# Patient Record
Sex: MALE | Race: WHITE | HISPANIC OR LATINO
[De-identification: names, ages, dates, MRNs, and addresses within clinical notes are randomized per-mention and may not be internally consistent; named-entity substitution may affect disease eponyms.]

---

## 2018-08-07 PROBLEM — Z00.00 ENCOUNTER FOR PREVENTIVE HEALTH EXAMINATION: Status: ACTIVE | Noted: 2018-08-07

## 2018-10-16 ENCOUNTER — RECORD ABSTRACTING (OUTPATIENT)
Age: 53
End: 2018-10-16

## 2018-10-16 DIAGNOSIS — E87.1 HYPO-OSMOLALITY AND HYPONATREMIA: ICD-10-CM

## 2018-10-17 ENCOUNTER — APPOINTMENT (OUTPATIENT)
Dept: ENDOCRINOLOGY | Facility: CLINIC | Age: 53
End: 2018-10-17
Payer: MEDICARE

## 2018-10-17 VITALS
HEIGHT: 67.5 IN | WEIGHT: 215 LBS | SYSTOLIC BLOOD PRESSURE: 130 MMHG | HEART RATE: 65 BPM | BODY MASS INDEX: 33.35 KG/M2 | DIASTOLIC BLOOD PRESSURE: 76 MMHG

## 2018-10-17 DIAGNOSIS — K76.0 FATTY (CHANGE OF) LIVER, NOT ELSEWHERE CLASSIFIED: ICD-10-CM

## 2018-10-17 DIAGNOSIS — Z78.9 OTHER SPECIFIED HEALTH STATUS: ICD-10-CM

## 2018-10-17 DIAGNOSIS — G47.33 OBSTRUCTIVE SLEEP APNEA (ADULT) (PEDIATRIC): ICD-10-CM

## 2018-10-17 DIAGNOSIS — Z83.3 FAMILY HISTORY OF DIABETES MELLITUS: ICD-10-CM

## 2018-10-17 DIAGNOSIS — F32.9 MAJOR DEPRESSIVE DISORDER, SINGLE EPISODE, UNSPECIFIED: ICD-10-CM

## 2018-10-17 PROCEDURE — 95251 CONT GLUC MNTR ANALYSIS I&R: CPT

## 2018-10-17 PROCEDURE — 99214 OFFICE O/P EST MOD 30 MIN: CPT | Mod: 25

## 2018-10-17 RX ORDER — MULTIVIT-MIN/IRON/FOLIC ACID/K 18-600-40
CAPSULE ORAL
Refills: 0 | Status: ACTIVE | COMMUNITY

## 2018-10-17 RX ORDER — CHOLECALCIFEROL (VITAMIN D3) 50 MCG
50 MCG TABLET ORAL
Qty: 90 | Refills: 0 | Status: ACTIVE | COMMUNITY

## 2018-10-17 RX ORDER — LOSARTAN POTASSIUM 100 MG/1
100 TABLET, FILM COATED ORAL DAILY
Qty: 1 | Refills: 0 | Status: ACTIVE | COMMUNITY

## 2018-10-17 RX ORDER — ATORVASTATIN CALCIUM 40 MG/1
40 TABLET, FILM COATED ORAL
Qty: 90 | Refills: 0 | Status: ACTIVE | COMMUNITY

## 2019-01-04 ENCOUNTER — MEDICATION RENEWAL (OUTPATIENT)
Age: 54
End: 2019-01-04

## 2019-01-07 ENCOUNTER — MEDICATION RENEWAL (OUTPATIENT)
Age: 54
End: 2019-01-07

## 2019-01-08 ENCOUNTER — RECORD ABSTRACTING (OUTPATIENT)
Age: 54
End: 2019-01-08

## 2019-01-08 DIAGNOSIS — Z86.69 PERSONAL HISTORY OF OTHER DISEASES OF THE NERVOUS SYSTEM AND SENSE ORGANS: ICD-10-CM

## 2019-01-08 DIAGNOSIS — Z80.9 FAMILY HISTORY OF MALIGNANT NEOPLASM, UNSPECIFIED: ICD-10-CM

## 2019-01-08 DIAGNOSIS — Z86.59 PERSONAL HISTORY OF OTHER MENTAL AND BEHAVIORAL DISORDERS: ICD-10-CM

## 2019-01-08 DIAGNOSIS — Z78.9 OTHER SPECIFIED HEALTH STATUS: ICD-10-CM

## 2019-01-08 DIAGNOSIS — Z86.39 PERSONAL HISTORY OF OTHER ENDOCRINE, NUTRITIONAL AND METABOLIC DISEASE: ICD-10-CM

## 2019-01-11 ENCOUNTER — CHART COPY (OUTPATIENT)
Age: 54
End: 2019-01-11

## 2019-01-16 ENCOUNTER — APPOINTMENT (OUTPATIENT)
Dept: ENDOCRINOLOGY | Facility: CLINIC | Age: 54
End: 2019-01-16
Payer: MEDICARE

## 2019-01-16 VITALS
OXYGEN SATURATION: 98 % | HEIGHT: 68 IN | BODY MASS INDEX: 31.07 KG/M2 | DIASTOLIC BLOOD PRESSURE: 70 MMHG | SYSTOLIC BLOOD PRESSURE: 112 MMHG | HEART RATE: 73 BPM | WEIGHT: 205 LBS

## 2019-01-16 LAB — GLUCOSE BLDC GLUCOMTR-MCNC: 158

## 2019-01-16 PROCEDURE — 82962 GLUCOSE BLOOD TEST: CPT

## 2019-01-16 PROCEDURE — 99214 OFFICE O/P EST MOD 30 MIN: CPT | Mod: 25

## 2019-01-16 NOTE — HISTORY OF PRESENT ILLNESS
[FreeTextEntry1] : Type: 1\par Severity: moderate\par Duration: 50 years (diagnosed at 18 months old)\par Associated symptoms: nephropathy, retinopathy, hypoglycemia\par Modifying factors: worse due to steroids for MS\par Gets MS treatments q 6 months and receives IV steroids at that time. Also gets PO steroids during periods of exacerbations. Last received PO steroids about 3 weeks ago.\par \par Current meds for glycemic control:\par [place] with Apidra\par SMBG with Dexcom. Calibrates every 12 hours\par Difficulty changing infusion sites and sensor sites on time due to supply issues.\par \par Last eye exam: cataract surgery July and August 2018. Retinopathy is stable since 2008\par Last foot exam: September 2018 (Dr Oscar Vidal)\par Diet: carb counting\par Weight: gain\par Exercise: walks the dog

## 2019-01-16 NOTE — ASSESSMENT
[FreeTextEntry1] : DM type 1, improving control\par Hypertension stable\par hyperlipidemia controlled

## 2019-02-07 ENCOUNTER — RX RENEWAL (OUTPATIENT)
Age: 54
End: 2019-02-07

## 2019-04-12 ENCOUNTER — APPOINTMENT (OUTPATIENT)
Dept: ENDOCRINOLOGY | Facility: CLINIC | Age: 54
End: 2019-04-12
Payer: COMMERCIAL

## 2019-04-12 PROCEDURE — P0015: CPT

## 2019-05-15 ENCOUNTER — APPOINTMENT (OUTPATIENT)
Dept: ENDOCRINOLOGY | Facility: CLINIC | Age: 54
End: 2019-05-15
Payer: MEDICARE

## 2019-05-15 VITALS
HEIGHT: 68 IN | WEIGHT: 210 LBS | BODY MASS INDEX: 31.83 KG/M2 | DIASTOLIC BLOOD PRESSURE: 64 MMHG | HEART RATE: 80 BPM | SYSTOLIC BLOOD PRESSURE: 122 MMHG

## 2019-05-15 LAB — GLUCOSE BLDC GLUCOMTR-MCNC: 103

## 2019-05-15 PROCEDURE — 82962 GLUCOSE BLOOD TEST: CPT

## 2019-05-15 PROCEDURE — 99214 OFFICE O/P EST MOD 30 MIN: CPT | Mod: 25

## 2019-05-15 RX ORDER — BUPROPION HYDROCHLORIDE 75 MG/1
TABLET, FILM COATED ORAL
Refills: 0 | Status: DISCONTINUED | COMMUNITY
End: 2019-05-15

## 2019-05-15 RX ORDER — TAMSULOSIN HYDROCHLORIDE 0.4 MG/1
0.4 CAPSULE ORAL
Refills: 0 | Status: DISCONTINUED | COMMUNITY
End: 2019-05-15

## 2019-05-15 NOTE — HISTORY OF PRESENT ILLNESS
[FreeTextEntry1] : Type: 1\par Severity: moderate\par Duration: 50 years (diagnosed at 18 months old)\par Associated symptoms: nephropathy, retinopathy, hypoglycemia\par Modifying factors: worse due to steroids for MS\par Gets MS treatments q 6 months and receives IV steroids at that time. Also gets PO steroids during periods of exacerbations. Last received PO steroids about 3 weeks ago.\par \par Current meds for glycemic control:\par Medtronic 670g with Apidra\par SMBG with Dexcom. Calibrates every 12 hours\par Difficulty changing infusion sites and sensor sites on time due to supply issues.\par \par Last eye exam: cataract surgery July and August 2018. Retinopathy is stable since 2008\par Last foot exam: September 2018 (Dr Oscar Vidal)\par Diet: carb counting\par Weight: gain\par Exercise: walks the dog\par \par self cath

## 2019-06-05 ENCOUNTER — OTHER (OUTPATIENT)
Age: 54
End: 2019-06-05

## 2019-08-01 ENCOUNTER — MEDICATION RENEWAL (OUTPATIENT)
Age: 54
End: 2019-08-01

## 2019-09-26 LAB
HBA1C MFR BLD HPLC: 7.2
LDLC SERPL DIRECT ASSAY-MCNC: 72

## 2019-09-27 ENCOUNTER — APPOINTMENT (OUTPATIENT)
Dept: ENDOCRINOLOGY | Facility: CLINIC | Age: 54
End: 2019-09-27
Payer: MEDICARE

## 2019-09-27 VITALS
DIASTOLIC BLOOD PRESSURE: 70 MMHG | OXYGEN SATURATION: 97 % | WEIGHT: 210 LBS | SYSTOLIC BLOOD PRESSURE: 132 MMHG | HEIGHT: 68 IN | HEART RATE: 75 BPM | BODY MASS INDEX: 31.83 KG/M2

## 2019-09-27 LAB — GLUCOSE BLDC GLUCOMTR-MCNC: 128

## 2019-09-27 PROCEDURE — 99214 OFFICE O/P EST MOD 30 MIN: CPT

## 2019-09-27 RX ORDER — SYRING-NEEDL,DISP,INSUL,0.3 ML 31GX15/64"
31G X 15/64" SYRINGE, EMPTY DISPOSABLE MISCELLANEOUS
Refills: 0 | Status: DISCONTINUED | COMMUNITY
End: 2019-09-27

## 2019-09-27 NOTE — PHYSICAL EXAM
[Clear to Auscultation] : lungs were clear to auscultation bilaterally [Regular Rhythm] : with a regular rhythm

## 2020-02-21 LAB
HBA1C MFR BLD HPLC: 7.3
LDLC SERPL DIRECT ASSAY-MCNC: 81

## 2020-02-24 ENCOUNTER — APPOINTMENT (OUTPATIENT)
Dept: ENDOCRINOLOGY | Facility: CLINIC | Age: 55
End: 2020-02-24
Payer: MEDICARE

## 2020-02-24 VITALS
DIASTOLIC BLOOD PRESSURE: 70 MMHG | SYSTOLIC BLOOD PRESSURE: 134 MMHG | HEART RATE: 89 BPM | HEIGHT: 68 IN | BODY MASS INDEX: 31.67 KG/M2 | WEIGHT: 209 LBS

## 2020-02-24 LAB — GLUCOSE BLDC GLUCOMTR-MCNC: 233

## 2020-02-24 PROCEDURE — 99215 OFFICE O/P EST HI 40 MIN: CPT | Mod: 25

## 2020-02-24 PROCEDURE — 82962 GLUCOSE BLOOD TEST: CPT

## 2020-02-24 RX ORDER — RANITIDINE 150 MG/1
150 TABLET ORAL
Refills: 0 | Status: ACTIVE | COMMUNITY

## 2020-02-24 RX ORDER — TAMSULOSIN HYDROCHLORIDE 0.4 MG/1
0.4 CAPSULE ORAL
Refills: 0 | Status: ACTIVE | COMMUNITY

## 2020-02-24 RX ORDER — AMLODIPINE BESYLATE 10 MG/1
10 TABLET ORAL DAILY
Qty: 90 | Refills: 0 | Status: ACTIVE | COMMUNITY

## 2020-02-24 RX ORDER — LATANOPROST/PF 0.005 %
0.01 DROPS OPHTHALMIC (EYE)
Refills: 0 | Status: ACTIVE | COMMUNITY

## 2020-02-24 RX ORDER — BUPROPION HYDROCHLORIDE 300 MG/1
300 TABLET, EXTENDED RELEASE ORAL DAILY
Qty: 30 | Refills: 0 | Status: ACTIVE | COMMUNITY

## 2020-02-24 RX ORDER — LABETALOL HYDROCHLORIDE 100 MG/1
100 TABLET, FILM COATED ORAL
Refills: 0 | Status: ACTIVE | COMMUNITY

## 2020-02-24 RX ORDER — PANTOPRAZOLE 20 MG/1
20 TABLET, DELAYED RELEASE ORAL
Refills: 0 | Status: ACTIVE | COMMUNITY

## 2020-02-24 RX ORDER — SERTRALINE HYDROCHLORIDE 100 MG/1
100 TABLET, FILM COATED ORAL
Refills: 0 | Status: ACTIVE | COMMUNITY

## 2020-02-24 RX ORDER — OCRELIZUMAB 300 MG/10ML
300 INJECTION INTRAVENOUS
Refills: 0 | Status: ACTIVE | COMMUNITY

## 2020-02-24 RX ORDER — GABAPENTIN 100 MG/1
100 CAPSULE ORAL
Refills: 0 | Status: ACTIVE | COMMUNITY

## 2020-02-24 RX ORDER — FINASTERIDE 5 MG/1
5 TABLET, FILM COATED ORAL
Refills: 0 | Status: ACTIVE | COMMUNITY

## 2020-02-24 RX ORDER — MODAFINIL 100 MG/1
100 TABLET ORAL DAILY
Qty: 90 | Refills: 0 | Status: ACTIVE | COMMUNITY

## 2020-02-24 NOTE — ASSESSMENT
[FreeTextEntry1] : DM type 1, overall good but suboptimal control. Review of dexcom reveals hypoglycemic trend overnight related to HS snack bolus; as a result changed ICR and CF at that time.\par Discussed upgrade to tandem if out of warranty\par discussed upgrade to G6 dexcom\par provided sample of humalog vial, and pt. will check with his insurance plan about alternate rapid acting insulins\par given pt. assistance paperwork\par Hypertension stable\par hyperlipidemia controlled negative...

## 2020-02-24 NOTE — HISTORY OF PRESENT ILLNESS
[FreeTextEntry1] : Type: 1\par Severity: moderate\par Duration: >50 years (diagnosed at 18 months old)\par Associated symptoms: nephropathy, retinopathy, hypoglycemia\par Modifying factors: worse due to steroids for MS\par Gets MS treatments q 6 months and receives IV steroids at that time. Also gets PO steroids during periods of exacerbations.\par \par Current meds for glycemic control:\par Medtronic 670g with Apidra. Having difficulty affording apidra\par SMBG with Dexcom. Calibrates every 12 hours\par Difficulty changing infusion sites and sensor sites on time due to supply issues.\par \par Last eye exam: cataract surgery July and August 2018. Retinopathy is stable since 2008\par Last foot exam: September 2018 (Dr Oscar Vidal)\par Diet: carb counting\par Weight: gain\par Exercise: walks the dog\par \par self cath

## 2020-04-24 ENCOUNTER — APPOINTMENT (OUTPATIENT)
Dept: ENDOCRINOLOGY | Facility: CLINIC | Age: 55
End: 2020-04-24

## 2020-04-24 ENCOUNTER — APPOINTMENT (OUTPATIENT)
Dept: ENDOCRINOLOGY | Facility: CLINIC | Age: 55
End: 2020-04-24
Payer: MEDICARE

## 2020-04-24 PROCEDURE — G0108 DIAB MANAGE TRN  PER INDIV: CPT | Mod: CR

## 2020-04-24 PROCEDURE — 98968 PH1 ASSMT&MGMT NQHP 21-30: CPT | Mod: CR

## 2020-06-18 ENCOUNTER — APPOINTMENT (OUTPATIENT)
Dept: ENDOCRINOLOGY | Facility: CLINIC | Age: 55
End: 2020-06-18
Payer: MEDICARE

## 2020-06-18 PROCEDURE — G0108 DIAB MANAGE TRN  PER INDIV: CPT

## 2020-07-16 ENCOUNTER — APPOINTMENT (OUTPATIENT)
Dept: ENDOCRINOLOGY | Facility: CLINIC | Age: 55
End: 2020-07-16
Payer: MEDICARE

## 2020-07-16 PROCEDURE — ZZZZZ: CPT

## 2020-07-16 PROCEDURE — 95249 CONT GLUC MNTR PT PROV EQP: CPT

## 2020-07-30 ENCOUNTER — NON-APPOINTMENT (OUTPATIENT)
Age: 55
End: 2020-07-30

## 2020-07-31 ENCOUNTER — APPOINTMENT (OUTPATIENT)
Dept: ENDOCRINOLOGY | Facility: CLINIC | Age: 55
End: 2020-07-31

## 2020-08-05 ENCOUNTER — APPOINTMENT (OUTPATIENT)
Dept: ENDOCRINOLOGY | Facility: CLINIC | Age: 55
End: 2020-08-05
Payer: MEDICARE

## 2020-08-05 ENCOUNTER — TRANSCRIPTION ENCOUNTER (OUTPATIENT)
Age: 55
End: 2020-08-05

## 2020-08-05 PROCEDURE — 99214 OFFICE O/P EST MOD 30 MIN: CPT | Mod: 95

## 2020-08-05 NOTE — ASSESSMENT
[FreeTextEntry1] : 55 year old male with long standing Type 1 DM on CSII with associated nephropathy/microalbuminuria, also with hypertension, hyperlipidemia, and vitamin D deficiency. \par \par Plan: \par 1.) Type 1 DM- check A1C now\par - once office reopen will download Tslim pump and Dexcom G6\par - continue current pump settings for now - until download \par -on steroids for MS exacerbation\par \par 2.) Hypertension- \par -Continue ARB.\par \par 3.) Hyperlipidemia- on statin.\par -Continue statin.\par -check lipids now \par \par 4.) Vitamin D deficiency- check levels now \par -Continue vitamin D supplement. \par \par Start Time: 10:30\par End Time: 10: 55

## 2020-08-05 NOTE — REVIEW OF SYSTEMS
[Recent Weight Gain (___ Lbs)] : recent weight gain: [unfilled] lbs [Fatigue] : no fatigue [Visual Field Defect] : no visual field defect [Blurred Vision] : no blurred vision [Polydipsia] : no polydipsia [FreeTextEntry8] : self cath 4 times a day

## 2020-08-05 NOTE — HISTORY OF PRESENT ILLNESS
[Other Location: e.g. School (Enter Location, City,State)___] : at [unfilled], at the time of the visit. [Other Location: e.g. Home (Enter Location, City,State)___] : at [unfilled] [Verbal consent obtained from patient] : the patient, [unfilled] [FreeTextEntry1] : Quality: Type 1 DM\par Severity: moderate\par Duration: 50 years (diagnosed at 18 months old)\par Associated symptoms: nephropathy, retinopathy, hypoglycemia\par Modifying factors: worse due to steroids for MS\par Gets MS treatments q 6 months and receives IV steroids at that time. Also gets PO steroids during periods of exacerbations. Last received PO steroids about 3 weeks ago.\par \par Current meds for glycemic control:\par Tslim pump with Apidra\par SMBG with Dexcom G6\par Difficulty changing infusion sites and sensor sites on time due to supply issues.\par Unable to download pump and sensor.  \par Reports Tslim is not giving bolus on control IQ - will need to come office for download \par Stress makes blood sugar rise \par \par Last eye exam: cataract surgery July and August 2018. Retinopathy is stable since 2008, last appointment 6/2020\par Last foot exam: 6/2020 (Dr Oscar Vidal) every 2 months, + neuropathy and MS \par Diet: carb counting\par Weight: gained 7 lbs \par Exercise: walks the dog at times

## 2020-08-13 ENCOUNTER — TRANSCRIPTION ENCOUNTER (OUTPATIENT)
Age: 55
End: 2020-08-13

## 2020-11-12 ENCOUNTER — APPOINTMENT (OUTPATIENT)
Dept: ENDOCRINOLOGY | Facility: CLINIC | Age: 55
End: 2020-11-12

## 2021-01-05 ENCOUNTER — APPOINTMENT (OUTPATIENT)
Dept: ENDOCRINOLOGY | Facility: CLINIC | Age: 56
End: 2021-01-05
Payer: MEDICARE

## 2021-01-05 VITALS
BODY MASS INDEX: 32.43 KG/M2 | TEMPERATURE: 97.3 F | SYSTOLIC BLOOD PRESSURE: 128 MMHG | HEIGHT: 68 IN | WEIGHT: 214 LBS | DIASTOLIC BLOOD PRESSURE: 66 MMHG

## 2021-01-05 PROCEDURE — 99214 OFFICE O/P EST MOD 30 MIN: CPT

## 2021-01-05 NOTE — ASSESSMENT
[FreeTextEntry1] : DM type 1, on t-slim pump, doing well\par Hyperlipidemia, on high dose statin\par hypertension controlled

## 2021-01-05 NOTE — HISTORY OF PRESENT ILLNESS
[FreeTextEntry1] : Type: 1\par Severity: moderate\par Duration: >50 years (diagnosed at 18 months old)\par Associated symptoms: nephropathy, retinopathy, hypoglycemia\par Modifying factors: worse due to steroids for MS\par Gets MS treatments q 6 months and receives IV steroids at that time. Also gets PO steroids during periods of exacerbations.\par \par Current meds for glycemic control:\par t-slim with humalog\par SMBG with Dexcom. Calibrates every 12 hours\par Difficulty changing infusion sites and sensor sites on time due to supply issues.\par \par Last eye exam: cataract surgery July and August 2018. Retinopathy is stable since 2008\par Last foot exam: September 2018 (Dr Oscar Vidal)\par Diet: carb counting\par Weight: gain\par Exercise: walks the dog\par \par self cath

## 2021-03-04 ENCOUNTER — NON-APPOINTMENT (OUTPATIENT)
Age: 56
End: 2021-03-04

## 2021-04-20 LAB
HBA1C MFR BLD HPLC: 6.4
LDLC SERPL DIRECT ASSAY-MCNC: 76
MICROALBUMIN/CREAT 24H UR-RTO: 23.6

## 2021-04-21 ENCOUNTER — APPOINTMENT (OUTPATIENT)
Dept: ENDOCRINOLOGY | Facility: CLINIC | Age: 56
End: 2021-04-21
Payer: MEDICARE

## 2021-04-21 VITALS
HEART RATE: 80 BPM | DIASTOLIC BLOOD PRESSURE: 70 MMHG | HEIGHT: 68 IN | SYSTOLIC BLOOD PRESSURE: 122 MMHG | WEIGHT: 208 LBS | BODY MASS INDEX: 31.52 KG/M2

## 2021-04-21 LAB — GLUCOSE BLDC GLUCOMTR-MCNC: 204

## 2021-04-21 PROCEDURE — 82962 GLUCOSE BLOOD TEST: CPT

## 2021-04-21 PROCEDURE — 99214 OFFICE O/P EST MOD 30 MIN: CPT | Mod: 25

## 2021-04-21 PROCEDURE — 95251 CONT GLUC MNTR ANALYSIS I&R: CPT

## 2021-04-21 RX ORDER — CHLORTHALIDONE 25 MG/1
25 TABLET ORAL
Refills: 0 | Status: ACTIVE | COMMUNITY

## 2021-05-04 NOTE — HISTORY OF PRESENT ILLNESS
[Continuous Glucose Monitoring] : Continuous Glucose Monitoring: Yes [Dexcom] : Dexcom [FreeTextEntry1] : Type: 1\par Severity: moderate\par Duration: >50 years (diagnosed at 18 months old)\par Associated symptoms: nephropathy, retinopathy, hypoglycemia\par Modifying factors: worse due to steroids for MS\par Gets MS treatments q 6 months and receives IV steroids at that time. Also gets PO steroids during periods of exacerbations.\par \par Current meds for glycemic control:\par t-slim with humalog\par SMBG with Dexcom. Calibrates every 12 hours\par Difficulty changing infusion sites and sensor sites on time due to supply issues.\par \par Last eye exam: cataract surgery July and August 2018. Retinopathy is stable since 2008\par Last foot exam: September 2018 (Dr Oscar Vidal)\par Diet: carb counting\par Weight: gain\par Exercise: walks the dog\par \par self cath\par \par hypertension. Placed on chlorthalidone by cardiology; scheduled for renal artery duplex  [FreeTextEntry2] : 66 [FreeTextEntry3] : 33 [FreeTextEntry4] : 1 [de-identified] : 7.0 [FreeTextEntry5] : 153 [FreeTextEntry6] : 33.2 [FreeTextEntry7] : 51

## 2021-05-04 NOTE — ADDENDUM
[FreeTextEntry1] : Glucose Sensor Necessity:  This patient with diabetes (dx: E10.65 )  is currently using a Dexcom continuous glucose monitoring device.  The patient is using a subcutaneous insulin infusion pump.  This patient requires frequent adjustments to his insulin treatment on the basis of therapeutic continuous glucose monitoring results by adjusting pump settings.  In addition, the patient has been to our office for an evaluation of his diabetes control within the past 6 months.  \par \par \par

## 2021-07-08 RX ORDER — INFUSION SET FOR INSULIN PUMP
INFUSION SETS-PARAPHERNALIA MISCELLANEOUS
Qty: 3 | Refills: 3 | Status: ACTIVE | COMMUNITY
Start: 2021-07-08 | End: 1900-01-01

## 2021-07-08 RX ORDER — INSULIN PUMP CARTRIDGE
CARTRIDGE (EA) SUBCUTANEOUS
Qty: 3 | Refills: 3 | Status: ACTIVE | COMMUNITY
Start: 2021-07-08 | End: 1900-01-01

## 2021-08-27 ENCOUNTER — APPOINTMENT (OUTPATIENT)
Dept: ENDOCRINOLOGY | Facility: CLINIC | Age: 56
End: 2021-08-27
Payer: MEDICARE

## 2021-08-27 VITALS
HEIGHT: 68 IN | SYSTOLIC BLOOD PRESSURE: 118 MMHG | HEART RATE: 76 BPM | DIASTOLIC BLOOD PRESSURE: 68 MMHG | OXYGEN SATURATION: 96 % | WEIGHT: 208 LBS | BODY MASS INDEX: 31.52 KG/M2

## 2021-08-27 LAB — GLUCOSE BLDC GLUCOMTR-MCNC: 279

## 2021-08-27 PROCEDURE — 95251 CONT GLUC MNTR ANALYSIS I&R: CPT

## 2021-08-27 PROCEDURE — 82962 GLUCOSE BLOOD TEST: CPT

## 2021-08-27 PROCEDURE — 99214 OFFICE O/P EST MOD 30 MIN: CPT | Mod: 25

## 2021-08-27 NOTE — HISTORY OF PRESENT ILLNESS
[Continuous Glucose Monitoring] : Continuous Glucose Monitoring: Yes [Dexcom] : Dexcom [FreeTextEntry1] : Type: 1\par Severity: moderate\par Duration: >50 years (diagnosed at 18 months old)\par Associated symptoms: nephropathy, retinopathy, hypoglycemia\par Modifying factors: worse due to steroids for MS\par Gets MS treatments q 6 months and receives IV steroids at that time. Also gets PO steroids during periods of exacerbations.\par \par Current meds for glycemic control:\par t-slim with humalog\par SMBG with Dexcom. Calibrates every 12 hours\par Difficulty changing infusion sites and sensor sites on time due to supply issues.\par \par Last eye exam: cataract surgery July and August 2018. Retinopathy is stable since 2008\par Last foot exam: September 2018 (Dr Oscar Vidal)\par Diet: carb counting\par Weight: gain\par Exercise: walks the dog\par \par self cath\par \par hypertension. Placed on chlorthalidone by cardiology; scheduled for renal artery duplex  [FreeTextEntry2] : 84 [FreeTextEntry3] : 15 [FreeTextEntry4] : 1 [de-identified] : 6.7 [FreeTextEntry5] : 142 [FreeTextEntry6] : 26.1 [FreeTextEntry7] : 37

## 2021-08-30 ENCOUNTER — TRANSCRIPTION ENCOUNTER (OUTPATIENT)
Age: 56
End: 2021-08-30

## 2021-08-30 LAB
ALBUMIN SERPL ELPH-MCNC: 4.5 G/DL
ALP BLD-CCNC: 104 U/L
ALT SERPL-CCNC: 19 U/L
ANION GAP SERPL CALC-SCNC: 14 MMOL/L
AST SERPL-CCNC: 14 U/L
BASOPHILS # BLD AUTO: 0.04 K/UL
BASOPHILS NFR BLD AUTO: 0.5 %
BILIRUB SERPL-MCNC: 0.6 MG/DL
BUN SERPL-MCNC: 12 MG/DL
CALCIUM SERPL-MCNC: 9.1 MG/DL
CHLORIDE SERPL-SCNC: 90 MMOL/L
CHOLEST SERPL-MCNC: 150 MG/DL
CO2 SERPL-SCNC: 25 MMOL/L
CREAT SERPL-MCNC: 0.88 MG/DL
EOSINOPHIL # BLD AUTO: 0.23 K/UL
EOSINOPHIL NFR BLD AUTO: 3.1 %
ESTIMATED AVERAGE GLUCOSE: 148 MG/DL
GLUCOSE SERPL-MCNC: 310 MG/DL
HBA1C MFR BLD HPLC: 6.8 %
HCT VFR BLD CALC: 42.4 %
HDLC SERPL-MCNC: 69 MG/DL
HGB BLD-MCNC: 14.2 G/DL
IMM GRANULOCYTES NFR BLD AUTO: 0.3 %
LDLC SERPL CALC-MCNC: 66 MG/DL
LYMPHOCYTES # BLD AUTO: 0.78 K/UL
LYMPHOCYTES NFR BLD AUTO: 10.4 %
MAN DIFF?: NORMAL
MCHC RBC-ENTMCNC: 30.5 PG
MCHC RBC-ENTMCNC: 33.5 GM/DL
MCV RBC AUTO: 91 FL
MONOCYTES # BLD AUTO: 0.73 K/UL
MONOCYTES NFR BLD AUTO: 9.7 %
NEUTROPHILS # BLD AUTO: 5.72 K/UL
NEUTROPHILS NFR BLD AUTO: 76 %
NONHDLC SERPL-MCNC: 81 MG/DL
PLATELET # BLD AUTO: 196 K/UL
POTASSIUM SERPL-SCNC: 4.8 MMOL/L
PROT SERPL-MCNC: 6.3 G/DL
RBC # BLD: 4.66 M/UL
RBC # FLD: 13.4 %
SODIUM SERPL-SCNC: 129 MMOL/L
TRIGL SERPL-MCNC: 72 MG/DL
TSH SERPL-ACNC: 2.18 UIU/ML
WBC # FLD AUTO: 7.52 K/UL

## 2021-12-15 LAB
HBA1C MFR BLD HPLC: 6.5
LDLC SERPL DIRECT ASSAY-MCNC: 67

## 2021-12-17 ENCOUNTER — APPOINTMENT (OUTPATIENT)
Dept: ENDOCRINOLOGY | Facility: CLINIC | Age: 56
End: 2021-12-17
Payer: MEDICARE

## 2021-12-17 VITALS
BODY MASS INDEX: 31.52 KG/M2 | SYSTOLIC BLOOD PRESSURE: 118 MMHG | HEIGHT: 68 IN | OXYGEN SATURATION: 98 % | HEART RATE: 69 BPM | WEIGHT: 208 LBS | DIASTOLIC BLOOD PRESSURE: 62 MMHG

## 2021-12-17 LAB — GLUCOSE BLDC GLUCOMTR-MCNC: 169

## 2021-12-17 PROCEDURE — 95251 CONT GLUC MNTR ANALYSIS I&R: CPT

## 2021-12-17 PROCEDURE — 82962 GLUCOSE BLOOD TEST: CPT

## 2021-12-17 PROCEDURE — 99214 OFFICE O/P EST MOD 30 MIN: CPT | Mod: 25

## 2021-12-17 NOTE — HISTORY OF PRESENT ILLNESS
[Continuous Glucose Monitoring] : Continuous Glucose Monitoring: Yes [Dexcom] : Dexcom [FreeTextEntry1] : Type: 1\par Severity: moderate\par Duration: >50 years (diagnosed at 18 months old)\par Associated symptoms: nephropathy, retinopathy, hypoglycemia\par Modifying factors: worse due to steroids for MS\par Gets MS treatments q 6 months and receives IV steroids at that time. Also gets PO steroids during periods of exacerbations.\par \par Current meds for glycemic control:\par t-slim with humalog\par SMBG with Dexcom. Calibrates every 12 hours\par Difficulty changing infusion sites and sensor sites on time due to supply issues.\par \par Last eye exam: cataract surgery July and August 2018. Retinopathy is stable since 2008\par Last foot exam: September 2018 (Dr Oscar Vidal)\par Diet: carb counting\par Weight: gain\par Exercise: walks the dog\par \par self cath\par \par hypertension. Placed on chlorthalidone by cardiology; scheduled for renal artery duplex  [FreeTextEntry2] : 67 [FreeTextEntry3] : 32 [FreeTextEntry4] : 1 [de-identified] : 6.9 [FreeTextEntry5] : 150 [FreeTextEntry6] : 30.5 [FreeTextEntry7] : 46

## 2022-01-13 ENCOUNTER — RX RENEWAL (OUTPATIENT)
Age: 57
End: 2022-01-13

## 2022-04-21 ENCOUNTER — APPOINTMENT (OUTPATIENT)
Dept: ENDOCRINOLOGY | Facility: CLINIC | Age: 57
End: 2022-04-21

## 2022-06-17 ENCOUNTER — APPOINTMENT (OUTPATIENT)
Dept: ENDOCRINOLOGY | Facility: CLINIC | Age: 57
End: 2022-06-17
Payer: MEDICARE

## 2022-06-17 VITALS
WEIGHT: 207 LBS | HEIGHT: 68 IN | DIASTOLIC BLOOD PRESSURE: 64 MMHG | BODY MASS INDEX: 31.37 KG/M2 | OXYGEN SATURATION: 98 % | HEART RATE: 71 BPM | SYSTOLIC BLOOD PRESSURE: 112 MMHG

## 2022-06-17 LAB — GLUCOSE BLDC GLUCOMTR-MCNC: 168

## 2022-06-17 PROCEDURE — 95251 CONT GLUC MNTR ANALYSIS I&R: CPT

## 2022-06-17 PROCEDURE — 82962 GLUCOSE BLOOD TEST: CPT

## 2022-06-17 PROCEDURE — 36415 COLL VENOUS BLD VENIPUNCTURE: CPT

## 2022-06-17 PROCEDURE — 99214 OFFICE O/P EST MOD 30 MIN: CPT | Mod: 25

## 2022-06-17 RX ORDER — INSULIN LISPRO 100 [IU]/ML
100 INJECTION, SOLUTION INTRAVENOUS; SUBCUTANEOUS
Qty: 90 | Refills: 0 | Status: DISCONTINUED | COMMUNITY
Start: 2022-01-28

## 2022-06-17 RX ORDER — AMOXICILLIN AND CLAVULANATE POTASSIUM 875; 125 MG/1; MG/1
875-125 TABLET, COATED ORAL
Qty: 14 | Refills: 0 | Status: DISCONTINUED | COMMUNITY
Start: 2022-02-08

## 2022-06-17 RX ORDER — HYDROCODONE BITARTRATE AND ACETAMINOPHEN 5; 325 MG/1; MG/1
5-325 TABLET ORAL
Qty: 8 | Refills: 0 | Status: DISCONTINUED | COMMUNITY
Start: 2022-02-08

## 2022-06-20 ENCOUNTER — NON-APPOINTMENT (OUTPATIENT)
Age: 57
End: 2022-06-20

## 2022-06-20 LAB
25(OH)D3 SERPL-MCNC: 47.8 NG/ML
ALBUMIN SERPL ELPH-MCNC: 4.5 G/DL
ALP BLD-CCNC: 114 U/L
ALT SERPL-CCNC: 19 U/L
ANION GAP SERPL CALC-SCNC: 16 MMOL/L
AST SERPL-CCNC: 17 U/L
BASOPHILS # BLD AUTO: 0.03 K/UL
BASOPHILS NFR BLD AUTO: 0.4 %
BILIRUB SERPL-MCNC: 0.4 MG/DL
BUN SERPL-MCNC: 13 MG/DL
CALCIUM SERPL-MCNC: 9.2 MG/DL
CHLORIDE SERPL-SCNC: 89 MMOL/L
CHOLEST SERPL-MCNC: 152 MG/DL
CO2 SERPL-SCNC: 27 MMOL/L
CREAT SERPL-MCNC: 0.83 MG/DL
EGFR: 103 ML/MIN/1.73M2
EOSINOPHIL # BLD AUTO: 0.15 K/UL
EOSINOPHIL NFR BLD AUTO: 2.1 %
ESTIMATED AVERAGE GLUCOSE: 148 MG/DL
GLUCOSE SERPL-MCNC: 98 MG/DL
HBA1C MFR BLD HPLC: 6.8 %
HCT VFR BLD CALC: 41.7 %
HDLC SERPL-MCNC: 67 MG/DL
HGB BLD-MCNC: 14.1 G/DL
IMM GRANULOCYTES NFR BLD AUTO: 0.3 %
LDLC SERPL CALC-MCNC: 77 MG/DL
LYMPHOCYTES # BLD AUTO: 0.91 K/UL
LYMPHOCYTES NFR BLD AUTO: 12.9 %
MAN DIFF?: NORMAL
MCHC RBC-ENTMCNC: 30.3 PG
MCHC RBC-ENTMCNC: 33.8 GM/DL
MCV RBC AUTO: 89.7 FL
MONOCYTES # BLD AUTO: 0.74 K/UL
MONOCYTES NFR BLD AUTO: 10.5 %
NEUTROPHILS # BLD AUTO: 5.2 K/UL
NEUTROPHILS NFR BLD AUTO: 73.8 %
NONHDLC SERPL-MCNC: 85 MG/DL
PLATELET # BLD AUTO: 201 K/UL
POTASSIUM SERPL-SCNC: 4 MMOL/L
PROT SERPL-MCNC: 6.5 G/DL
RBC # BLD: 4.65 M/UL
RBC # FLD: 13.7 %
SODIUM SERPL-SCNC: 133 MMOL/L
T3FREE SERPL-MCNC: 3.32 PG/ML
T4 FREE SERPL-MCNC: 1 NG/DL
TRIGL SERPL-MCNC: 39 MG/DL
TSH SERPL-ACNC: 1.77 UIU/ML
VIT B12 SERPL-MCNC: 588 PG/ML
WBC # FLD AUTO: 7.05 K/UL

## 2022-07-05 NOTE — ASSESSMENT
[Diabetes Foot Care] : diabetes foot care [Long Term Vascular Complications] : long term vascular complications of diabetes [Importance of Diet and Exercise] : importance of diet and exercise to improve glycemic control, achieve weight loss and improve cardiovascular health [Retinopathy Screening] : Patient was referred to ophthalmology for retinopathy screening [FreeTextEntry1] : T1DM\par -Reviewed risk/complication of uncontrolled DM \par -Increase exercise as tolerated 5 days a week x 30 mins/day\par -Increase dietary efforts, low carb/low sugar\par -Continue wear DEXCOM and T-SLim pump\par .Changes to pump settings\par Pump Settings:\par 0000: 1.2\par 0300: 1.0\par 0700: 1.0\par 0900: 0.9-->1.0\par 12PM: 0.9\par ADD 6PM 1.0 \par 10PM: 0.9\par -Repeat HgbA1C now\par -Follow up with all specialist including optho, podiatry, urologist\par \par \par Glucose Sensor Necessity: This patient with diabetes performs 4 glucose checks per day utilizing a home blood glucose monitor. The patient is treated with insulin via 4 injections daily . This patient requires frequent adjustments to their insulin treatment on the basis of therapeutic continuous glucose monitoring results by adjusting fixed insulin doses. In addition, the patient has been to our office for an evaluation of their diabetes control within the past 6 months.\par \par  \par MS\par follow up with specialist \par \par HTN\par /64\par continue current rehimen and follow up with cardiologist \par \par Fasting labs due now \par \par RTO in 8 weeks NP\par RTO in 6 months Dr. Muñoz

## 2022-07-05 NOTE — HISTORY OF PRESENT ILLNESS
[FreeTextEntry1] : Interval Hx: MS --> gets treatment every 6 months which has steroids. When he has flare up he is put on steroids and sugars are then elevated \par \par Type: 1\par Severity: moderate\par Duration: >50 years (diagnosed at 18 months old)\par Associated symptoms: nephropathy, retinopathy, hypoglycemia\par Modifying factors: worse due to steroids for MS\par Gets MS treatments q 6 months and receives IV steroids at that time. Also gets PO steroids during periods of exacerbations.\par \par Current meds for glycemic control:\par t-slim with humalog\par SMBG with Dexcom.\par \par Pump Settings:\par 0000: 1.2\par 0300: 1.0\par 0700: 1.0\par 0900: 0.9\par 12PM: 0.9\par 10PM: 0.9\par \par Average Glucose: 154\par Target Range:59 %\par Very High: 3%\par High: 34%\par Low: 3%\par Very Low: <1% \par Current FS: 168,, fasting \par \par Elevated sugars after breakfast 9-12pm\par Elevated sugars after dinner around 6 pm\par Last eye exam: cataract surgery July and August 2018. Retinopathy is stable since 2008, LV 3 weeks ago. Has Retina Exam 6/29/22.\par Last foot exam: 4/2022 (Dr Oscar Vidal), has appointment next week \par Diet: carb counting\par Weight: stable \par Exercise: walks the dog, taking care of mom \par \par self cath, no infections \par \par hypertension. Placed on chlorthalidone by cardiology\par Yearly follow ups by cardiologist \par \par \par \par

## 2022-07-05 NOTE — REVIEW OF SYSTEMS
[Fatigue] : fatigue [Constipation] : constipation [Diarrhea] : diarrhea [Recent Weight Gain (___ Lbs)] : no recent weight gain [Recent Weight Loss (___ Lbs)] : no recent weight loss [Blurred Vision] : no blurred vision [Dysphagia] : no dysphagia [Neck Pain] : no neck pain [Chest Pain] : no chest pain [Palpitations] : no palpitations [Shortness Of Breath] : no shortness of breath [Nausea] : no nausea [Vomiting] : no vomiting [Polyuria] : no polyuria [Polydipsia] : no polydipsia

## 2022-07-15 RX ORDER — INSULIN LISPRO 100 [IU]/ML
100 INJECTION, SOLUTION INTRAVENOUS; SUBCUTANEOUS
Qty: 9 | Refills: 1 | Status: DISCONTINUED | COMMUNITY
Start: 2020-09-25 | End: 2022-07-15

## 2022-08-15 ENCOUNTER — APPOINTMENT (OUTPATIENT)
Dept: ENDOCRINOLOGY | Facility: CLINIC | Age: 57
End: 2022-08-15

## 2022-08-15 VITALS
HEART RATE: 75 BPM | HEIGHT: 68 IN | WEIGHT: 210 LBS | DIASTOLIC BLOOD PRESSURE: 60 MMHG | OXYGEN SATURATION: 98 % | SYSTOLIC BLOOD PRESSURE: 122 MMHG | BODY MASS INDEX: 31.83 KG/M2

## 2022-08-15 LAB — GLUCOSE BLDC GLUCOMTR-MCNC: 174

## 2022-08-15 PROCEDURE — 95251 CONT GLUC MNTR ANALYSIS I&R: CPT

## 2022-08-15 PROCEDURE — 82962 GLUCOSE BLOOD TEST: CPT

## 2022-08-15 PROCEDURE — 99214 OFFICE O/P EST MOD 30 MIN: CPT | Mod: 25

## 2022-08-15 NOTE — REVIEW OF SYSTEMS
[Fatigue] : fatigue [Recent Weight Gain (___ Lbs)] : recent weight gain: [unfilled] lbs [Constipation] : constipation [Diarrhea] : diarrhea [Tremors] : tremors [Decreased Appetite] : appetite not decreased [Visual Field Defect] : no visual field defect [Blurred Vision] : no blurred vision [Dysphagia] : no dysphagia [Neck Pain] : no neck pain [Dysphonia] : no dysphonia [Chest Pain] : no chest pain [Palpitations] : no palpitations [Polyuria] : no polyuria [Dysuria] : no dysuria [Headaches] : no headaches [Depression] : no depression [Anxiety] : no anxiety [Polydipsia] : no polydipsia [Swelling] : no swelling [FreeTextEntry2] : due to MS  [FreeTextEntry7] : seeapoorva SAAVEDRA [FreeTextEntry8] : see cath 4 times a day, no infection  [de-identified] : due to MS [de-identified] : controlled with medication and sees therapist

## 2022-08-15 NOTE — PHYSICAL EXAM
[Alert] : alert [Well Nourished] : well nourished [No Acute Distress] : no acute distress [Well Developed] : well developed [Normal Sclera/Conjunctiva] : normal sclera/conjunctiva [No Proptosis] : no proptosis [Normal Hearing] : hearing was normal [No LAD] : no lymphadenopathy [Thyroid Not Enlarged] : the thyroid was not enlarged [No Thyroid Nodules] : no palpable thyroid nodules [No Respiratory Distress] : no respiratory distress [No Accessory Muscle Use] : no accessory muscle use [Normal Rate and Effort] : normal respiratory rate and effort [Clear to Auscultation] : lungs were clear to auscultation bilaterally [Normal S1, S2] : normal S1 and S2 [Normal Rate] : heart rate was normal [Regular Rhythm] : with a regular rhythm [Normal Bowel Sounds] : normal bowel sounds [Not Tender] : non-tender [Soft] : abdomen soft [No Rash] : no rash [Acanthosis Nigricans] : no acanthosis nigricans [No Tremors] : no tremors [Oriented x3] : oriented to person, place, and time [Normal Affect] : the affect was normal [Normal Insight/Judgement] : insight and judgment were intact [Normal Mood] : the mood was normal

## 2022-08-15 NOTE — HISTORY OF PRESENT ILLNESS
[FreeTextEntry1] : Interval Hx: MS --> gets treatment every 6 months which has steroids. When he has flare up he is put on steroids and sugars are then elevated \par \par Type: 1\par Severity: moderate\par Duration: >50 years (diagnosed at 18 months old)\par Associated symptoms: nephropathy, retinopathy, hypoglycemia\par Modifying factors: worse due to steroids for MS\par Gets MS treatments q 6 months and receives IV steroids at that time. Also gets PO steroids during periods of exacerbations.\par \par Current meds for glycemic control:\par t-slim with humalog\par SMBG with Dexcom.\par \par Pump Settings:\par 0000: 1.2\par 0300: 1.0\par 0700: 1.0\par 0900: 1.0\par 12PM: 0.9\par 6PM 1.0 \par 10PM: 0.9\par \par Average Glucose: 150\par Target Range:77%\par High: 21%\par Low: 1%\par Current FS: 174\par Needs better control on overnights\par \par Elevated sugars after breakfast 9-12pm\par Elevated sugars after dinner around 6 pm\par Last eye exam: cataract surgery July and August 2018. Retinopathy is stable since 2008, LV 3 weeks ago. Retina Exam 7/2022\par Last foot exam: 5 weeks ago (Dr Oscar Vidal), appointment every 2 months\par Diet: carb counting\par Weight: gained 3 pounds\par Exercise: walks the dog, taking care of mom \par \par self cath, no infections \par \par hypertension. Placed on chlorthalidone by cardiology\par Yearly follow ups by cardiologist \par

## 2022-08-15 NOTE — ASSESSMENT
[FreeTextEntry1] : T1DM\par -Reviewed risk/complication of uncontrolled DM \par -Increase exercise as tolerated 5 days a week x 30 mins/day\par -Increase dietary efforts, low carb/low sugar\par -Continue wear DEXCOM and T-SLim pump\par .Changes to pump settings\par Pump Settings:\par Decrease Correction factor to 25 at 10pm\par Start Sleep mode\par -Repeat HgbA1C in 1 month\par -Follow up with all specialist including optho, podiatry, urologist\par \par \par Glucose Sensor Necessity: This patient with diabetes performs 4 glucose checks per day utilizing a home blood glucose monitor. The patient is treated with insulin via 4 injections daily . This patient requires frequent adjustments to their insulin treatment on the basis of therapeutic continuous glucose monitoring results by adjusting fixed insulin doses. In addition, the patient has been to our office for an evaluation of their diabetes control within the past 6 months.\par \par  \par MS\par follow up with specialist \par \par HTN\par /60\par continue current regimen and follow up with cardiologist \par \par Fasting labs due in 1 month\par \par RTO in 8 weeks NP\par RTO in 4 months Dr. Muñoz

## 2022-10-17 ENCOUNTER — APPOINTMENT (OUTPATIENT)
Dept: ENDOCRINOLOGY | Facility: CLINIC | Age: 57
End: 2022-10-17

## 2022-10-17 VITALS
HEIGHT: 68 IN | DIASTOLIC BLOOD PRESSURE: 70 MMHG | BODY MASS INDEX: 31.83 KG/M2 | OXYGEN SATURATION: 98 % | WEIGHT: 210 LBS | HEART RATE: 76 BPM | SYSTOLIC BLOOD PRESSURE: 110 MMHG

## 2022-10-17 LAB — GLUCOSE BLDC GLUCOMTR-MCNC: 225

## 2022-10-17 PROCEDURE — 99214 OFFICE O/P EST MOD 30 MIN: CPT | Mod: 25

## 2022-10-17 PROCEDURE — 82962 GLUCOSE BLOOD TEST: CPT

## 2022-10-17 NOTE — PHYSICAL EXAM
[Alert] : alert [Well Nourished] : well nourished [No Acute Distress] : no acute distress [Well Developed] : well developed [Normal Sclera/Conjunctiva] : normal sclera/conjunctiva [No Proptosis] : no proptosis [Normal Hearing] : hearing was normal [No LAD] : no lymphadenopathy [Thyroid Not Enlarged] : the thyroid was not enlarged [No Thyroid Nodules] : no palpable thyroid nodules [No Respiratory Distress] : no respiratory distress [No Accessory Muscle Use] : no accessory muscle use [Normal Rate and Effort] : normal respiratory rate and effort [Clear to Auscultation] : lungs were clear to auscultation bilaterally [Normal S1, S2] : normal S1 and S2 [Normal Rate] : heart rate was normal [Regular Rhythm] : with a regular rhythm [No Edema] : no peripheral edema [Normal Bowel Sounds] : normal bowel sounds [Not Tender] : non-tender [Soft] : abdomen soft [No Rash] : no rash [No Tremors] : no tremors [Oriented x3] : oriented to person, place, and time [Normal Affect] : the affect was normal [Normal Insight/Judgement] : insight and judgment were intact [Normal Mood] : the mood was normal [Acanthosis Nigricans] : no acanthosis nigricans

## 2022-10-17 NOTE — REVIEW OF SYSTEMS
[Fatigue] : fatigue [Constipation] : constipation [Swelling] : swelling [Decreased Appetite] : appetite not decreased [Recent Weight Gain (___ Lbs)] : no recent weight gain [Recent Weight Loss (___ Lbs)] : no recent weight loss [Visual Field Defect] : no visual field defect [Blurred Vision] : no blurred vision [Dysphagia] : no dysphagia [Neck Pain] : no neck pain [Dysphonia] : no dysphonia [Chest Pain] : no chest pain [Palpitations] : no palpitations [Diarrhea] : no diarrhea [Polyuria] : no polyuria [Dysuria] : no dysuria [Headaches] : no headaches [Tremors] : no tremors [Depression] : no depression [Anxiety] : no anxiety [Polydipsia] : no polydipsia [FreeTextEntry2] : weight stable  [FreeTextEntry7] : sometimes - see gastro [FreeTextEntry8] : straight cath  [de-identified] : controlled with medication and therapist  [de-identified] : both legs

## 2022-10-17 NOTE — HISTORY OF PRESENT ILLNESS
[FreeTextEntry1] : Interval Hx: MS --> gets treatment every 6 months which has steroids. When he has flare up he is put on steroids and sugars are then elevated \par \par Type: 1\par Severity: moderate\par Duration: >50 years (diagnosed at 18 months old)\par Associated symptoms: nephropathy, retinopathy, hypoglycemia\par Modifying factors: worse due to steroids for MS\par Gets MS treatments q 6 months and receives IV steroids at that time. Also gets PO steroids during periods of exacerbations.\par \par Current meds for glycemic control:\par t-slim with humalog\par SMBG with Dexcom.\par \par Pump Settings:\par 0000: 1.2\par 0300: 1.0\par 0700: 1.0\par 0900: 1.0\par 12PM: 0.9\par 6PM 1.0 \par 10PM: 0.9\par \par Average Glucose: 151\par Target Range:68%\par High: 29%\par Low: 3%\par Current FS: 225\par overnights improved on sleep mode\par \par Last week received steroid injection causing elevated blood sugars. Patient breaks up bolus in order to prevent low blood sugars from occurring. He reports guessing carb amounts. \par \par \par Last eye exam: cataract surgery July and August 2018. Retinopathy is stable since 2008, LV 3 weeks ago. Retina Exam 2 months ago \par Last foot exam: 5 weeks ago (Dr Oscar Vidal), appointment every 2 months, denies neuropathy\par Diet: carb counting\par Weight: stable\par Exercise: walks the dog, taking care of mom \par \par self cath, no infections \par \par hypertension. /70 in office. Placed on chlorthalidone by cardiology\par Yearly follow ups by cardiologist \par

## 2022-10-17 NOTE — ASSESSMENT
[FreeTextEntry1] : T1DM: stable controlled, needs to modify diet, start carb counting using "[employer] norma \par -Reviewed risk/complication of uncontrolled DM \par -Increase exercise as tolerated 5 days a week x 30 mins/day\par -Increase dietary efforts, low carb/low sugar\par -Continue wear DEXCOM and T-Slim pump\par -Continue current pump settings - work on carb counting\par -Repeat HgbA1C now \par -Follow up with all specialist including optho, podiatry, urologist\par \par \par Glucose Sensor Necessity: This patient with diabetes performs 4 glucose checks per day utilizing a home blood glucose monitor. The patient is treated with insulin via 4 injections daily . This patient requires frequent adjustments to their insulin treatment on the basis of therapeutic continuous glucose monitoring results by adjusting fixed insulin doses. In addition, the patient has been to our office for an evaluation of their diabetes control within the past 6 months.\par  \par MS\par follow up with specialist \par \par HTN BP acceptable\par continue current regimen and follow up with cardiologist \par \par HLD: continue satin, check lipids now\par follow a low fat diet\par \par Fasting labs due now\par \par RTO in 2-3 weeks with CDE for carb counting and pump and Dexcom download \par RTO in 2 months Dr. Muñoz

## 2022-11-04 ENCOUNTER — APPOINTMENT (OUTPATIENT)
Dept: ENDOCRINOLOGY | Facility: CLINIC | Age: 57
End: 2022-11-04

## 2022-12-15 ENCOUNTER — APPOINTMENT (OUTPATIENT)
Dept: ENDOCRINOLOGY | Facility: CLINIC | Age: 57
End: 2022-12-15

## 2022-12-15 VITALS
DIASTOLIC BLOOD PRESSURE: 60 MMHG | SYSTOLIC BLOOD PRESSURE: 122 MMHG | BODY MASS INDEX: 30.92 KG/M2 | HEART RATE: 83 BPM | HEIGHT: 68 IN | OXYGEN SATURATION: 97 % | WEIGHT: 204 LBS

## 2022-12-15 LAB — GLUCOSE BLDC GLUCOMTR-MCNC: 118

## 2022-12-15 PROCEDURE — 82962 GLUCOSE BLOOD TEST: CPT

## 2022-12-15 PROCEDURE — 99214 OFFICE O/P EST MOD 30 MIN: CPT | Mod: 25

## 2022-12-24 ENCOUNTER — RX RENEWAL (OUTPATIENT)
Age: 57
End: 2022-12-24

## 2023-03-17 ENCOUNTER — RX RENEWAL (OUTPATIENT)
Age: 58
End: 2023-03-17

## 2023-03-21 NOTE — ASSESSMENT
[FreeTextEntry1] : DM type 1, fairly good but suboptimal control, with transient decline in control with steroid therapy\par update labs\par \par Glucose Sensor Necessity: This patient with diabetes performs 4 glucose checks per day utilizing a home blood glucose monitor. The patient is treated with insulin via an insulin pump . This patient requires frequent adjustments to their insulin treatment on the basis of therapeutic continuous glucose monitoring results by adjusting pump settings. In addition, the patient has been to our office for an evaluation of their diabetes control within the past 6 months.\par  \par

## 2023-03-21 NOTE — HISTORY OF PRESENT ILLNESS
[Continuous Glucose Monitoring] : Continuous Glucose Monitoring: Yes [Dexcom] : Dexcom [FreeTextEntry1] : Interval Hx: MS --> gets treatment every 6 months which has steroids. When he has flare up he is put on steroids and sugars are then elevated \par \par Type: 1\par Severity: moderate\par Duration: >50 years (diagnosed at 18 months old)\par Associated symptoms: nephropathy, retinopathy, hypoglycemia\par Modifying factors: worse due to steroids for MS\par Gets MS treatments q 6 months and receives IV steroids at that time. Also gets PO steroids during periods of exacerbations.\par \par Current meds for glycemic control:\par t-slim with humalog\par SMBG with Dexcom.\par \par Pump Settings:\par 0000: 1.2\par 0300: 1.0\par 0700: 1.0\par 0900: 1.0\par 12PM: 0.9\par 6PM 1.0 \par 10PM: 0.9\par \par s/p COVID\par overnights improved on sleep mode\par \par Last week received steroid injection causing elevated blood sugars. Patient breaks up bolus in order to prevent low blood sugars from occurring. He reports guessing carb amounts. \par \par \par Last eye exam: cataract surgery July and August 2018. Retinopathy is stable since 2008, LV 3 weeks ago. Retina Exam 2 months ago \par Last foot exam: 5 weeks ago (Dr Oscar Vidal), appointment every 2 months, denies neuropathy\par Diet: carb counting\par Weight: stable\par Exercise: walks the dog, taking care of mom \par \par self cath, no infections \par \par hypertension. /70 in office. Placed on chlorthalidone by cardiology\par Yearly follow ups by cardiologist \par  [FreeTextEntry2] : 59 [FreeTextEntry3] : 39 [FreeTextEntry4] : 1 [de-identified] : 7.3 [FreeTextEntry5] : 167 [FreeTextEntry6] : 31.8

## 2023-03-24 ENCOUNTER — APPOINTMENT (OUTPATIENT)
Dept: ENDOCRINOLOGY | Facility: CLINIC | Age: 58
End: 2023-03-24
Payer: MEDICARE

## 2023-03-24 PROCEDURE — G0108 DIAB MANAGE TRN  PER INDIV: CPT | Mod: GA

## 2023-03-27 RX ORDER — BLOOD SUGAR DIAGNOSTIC
STRIP MISCELLANEOUS
Refills: 0 | Status: DISCONTINUED | COMMUNITY
End: 2023-03-27

## 2023-03-28 RX ORDER — LANCETS 30 GAUGE
EACH MISCELLANEOUS
Qty: 1 | Refills: 2 | Status: ACTIVE | COMMUNITY
Start: 2023-03-27 | End: 1900-01-01

## 2023-03-28 RX ORDER — BLOOD SUGAR DIAGNOSTIC
STRIP MISCELLANEOUS
Qty: 100 | Refills: 2 | Status: ACTIVE | COMMUNITY
Start: 2023-03-27 | End: 1900-01-01

## 2023-06-16 LAB
HBA1C MFR BLD HPLC: 6.5
TSH SERPL-ACNC: 1.93

## 2023-06-19 ENCOUNTER — APPOINTMENT (OUTPATIENT)
Dept: ENDOCRINOLOGY | Facility: CLINIC | Age: 58
End: 2023-06-19
Payer: MEDICARE

## 2023-06-19 PROCEDURE — 99214 OFFICE O/P EST MOD 30 MIN: CPT | Mod: 95

## 2023-06-19 NOTE — ASSESSMENT
[FreeTextEntry1] : DM type 1, good control with occasional elevations due to corticosteroids and postprandials.\par Hyperlipidemia, stable.\par \par Glucose Sensor Necessity: This patient with diabetes uses a dexcom CGM.. The patient is treated with insulin via an insulin pump . This patient requires frequent adjustments to their insulin treatment on the basis of therapeutic continuous glucose monitoring results by adjusting pump settings. In addition, the patient has been to our office for an evaluation of his diabetes control within the past 6 months.\par  \par

## 2023-06-19 NOTE — HISTORY OF PRESENT ILLNESS
[Continuous Glucose Monitoring] : Continuous Glucose Monitoring: Yes [Dexcom] : Dexcom [Home] : at home, [unfilled] , at the time of the visit. [Medical Office: ([facility])___] : at the medical office located in  [Verbal consent obtained from patient] : the patient, [unfilled] [FreeTextEntry1] : Interval Hx: MS --> gets treatment every 6 months which has steroids. When he has flare up he is put on steroids and sugars are then elevated \par \par Type: 1\par Severity: moderate\par Duration: >50 years (diagnosed at 18 months old)\par Associated symptoms: nephropathy, retinopathy, hypoglycemia\par Modifying factors: worse due to steroids for MS\par Gets MS treatments q 6 months and receives IV steroids at that time. Also gets PO steroids during periods of exacerbations.\par \par Current meds for glycemic control:\par t-slim with humalog\par SMBG with Dexcom.\par \par Pump Settings:\par 0000: 1.2\par 0300: 1.0\par 0700: 1.0\par 0900: 1.0\par 12PM: 0.9\par 6PM 1.0 \par 10PM: 0.9\par \par s/p COVID\par overnights improved on sleep mode\par Patient breaks up bolus in order to prevent low blood sugars from occurring. He reports guessing carb amounts. \par \par \par Last eye exam: cataract surgery July and August 2018. Retinopathy is stable since 2008, LV 3 weeks ago. Retina Exam 2 months ago \par Podiatry (Dr Oscar Vidal), appointment every 2 months, denies neuropathy\par Diet: carb counting\par Weight: stable\par Exercise: walks the dog, taking care of mom \par \par self cath, no infections \par \par hypertension. /70 in office. Placed on chlorthalidone by cardiology\par Yearly follow ups by cardiologist \par  [FreeTextEntry2] : 59 [FreeTextEntry3] : 39 [FreeTextEntry4] : 1 [de-identified] : 7.3 [FreeTextEntry5] : 167 [FreeTextEntry6] : 31.8

## 2023-09-05 ENCOUNTER — RX RENEWAL (OUTPATIENT)
Age: 58
End: 2023-09-05

## 2023-11-28 ENCOUNTER — APPOINTMENT (OUTPATIENT)
Dept: ENDOCRINOLOGY | Facility: CLINIC | Age: 58
End: 2023-11-28
Payer: MEDICARE

## 2023-11-28 VITALS
HEIGHT: 68 IN | WEIGHT: 206 LBS | BODY MASS INDEX: 31.22 KG/M2 | DIASTOLIC BLOOD PRESSURE: 67 MMHG | HEART RATE: 75 BPM | OXYGEN SATURATION: 99 % | SYSTOLIC BLOOD PRESSURE: 120 MMHG

## 2023-11-28 LAB — GLUCOSE BLDC GLUCOMTR-MCNC: 123

## 2023-11-28 PROCEDURE — 82962 GLUCOSE BLOOD TEST: CPT

## 2023-11-28 PROCEDURE — 99214 OFFICE O/P EST MOD 30 MIN: CPT | Mod: 25

## 2023-11-28 PROCEDURE — 95251 CONT GLUC MNTR ANALYSIS I&R: CPT

## 2023-11-28 RX ORDER — BUPROPION HYDROCHLORIDE 150 MG/1
150 TABLET, EXTENDED RELEASE ORAL DAILY
Qty: 30 | Refills: 0 | Status: DISCONTINUED | COMMUNITY
End: 2023-11-28

## 2023-11-28 RX ORDER — LINACLOTIDE 145 UG/1
145 CAPSULE, GELATIN COATED ORAL
Refills: 0 | Status: DISCONTINUED | COMMUNITY
End: 2023-11-28

## 2023-11-28 RX ORDER — LINACLOTIDE 72 UG/1
72 CAPSULE, GELATIN COATED ORAL
Qty: 90 | Refills: 0 | Status: DISCONTINUED | COMMUNITY
Start: 2022-01-26 | End: 2023-11-28

## 2024-02-18 ENCOUNTER — TRANSCRIPTION ENCOUNTER (OUTPATIENT)
Age: 59
End: 2024-02-18

## 2024-02-26 LAB
HBA1C MFR BLD HPLC: 6.4
LDLC SERPL CALC-MCNC: 66
TSH SERPL-ACNC: 2.25

## 2024-02-27 ENCOUNTER — APPOINTMENT (OUTPATIENT)
Dept: ENDOCRINOLOGY | Facility: CLINIC | Age: 59
End: 2024-02-27
Payer: MEDICARE

## 2024-02-27 VITALS
HEIGHT: 68 IN | HEART RATE: 66 BPM | OXYGEN SATURATION: 97 % | BODY MASS INDEX: 32.13 KG/M2 | SYSTOLIC BLOOD PRESSURE: 100 MMHG | WEIGHT: 212 LBS | DIASTOLIC BLOOD PRESSURE: 60 MMHG

## 2024-02-27 DIAGNOSIS — E10.65 TYPE 1 DIABETES MELLITUS WITH HYPERGLYCEMIA: ICD-10-CM

## 2024-02-27 DIAGNOSIS — I10 ESSENTIAL (PRIMARY) HYPERTENSION: ICD-10-CM

## 2024-02-27 DIAGNOSIS — E55.9 VITAMIN D DEFICIENCY, UNSPECIFIED: ICD-10-CM

## 2024-02-27 LAB — GLUCOSE BLDC GLUCOMTR-MCNC: 170

## 2024-02-27 PROCEDURE — 82962 GLUCOSE BLOOD TEST: CPT

## 2024-02-27 PROCEDURE — 99214 OFFICE O/P EST MOD 30 MIN: CPT
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
## 2024-02-27 NOTE — REVIEW OF SYSTEMS
[Fatigue] : fatigue [Recent Weight Gain (___ Lbs)] : recent weight gain: [unfilled] lbs [Visual Field Defect] : visual field defect [Constipation] : constipation [Diarrhea] : diarrhea [Decreased Appetite] : appetite not decreased [Blurred Vision] : no blurred vision [Neck Pain] : no neck pain [Dysphagia] : no dysphagia [Chest Pain] : no chest pain [Dysphonia] : no dysphonia [Palpitations] : no palpitations [Polyuria] : no polyuria [Dysuria] : no dysuria [Headaches] : no headaches [Tremors] : no tremors [Anxiety] : no anxiety [Depression] : no depression [Polydipsia] : no polydipsia [FreeTextEntry3] : going to follow up with eye doctor [de-identified] : seeing therapist and controlled with medication  [FreeTextEntry8] : self cath [FreeTextEntry7] : going to see GI

## 2024-02-27 NOTE — ASSESSMENT
[FreeTextEntry1] : DM type 1, stable control with occasional elevations due to corticosteroids (last given in January) and postprandial. Continue current pump settings. Encouraged to bolus before meals. He will work on carb counting and modifying diet.   Hyperlipidemia, well controlled, continue statin  Hypertension, good control, continue current medication regimen   This patient with diabetes - Is currently using CGM and is receiving insulin using an insulin pump - Is adjusting insulin dose using a correction factor programmed into the pump, as documented in the medical record - Has been seen in the office by a provider within the last six months to review CGM data with their provider - Has completed a diabetes education program Dexcom require one test strip daily to use in case of sensor failure or for blood glucose verification or during sensor warmup.  Repeat labs before next visit.  RTO in 3 months with Dr. Muñoz.

## 2024-02-27 NOTE — PHYSICAL EXAM
[Alert] : alert [Well Nourished] : well nourished [No Acute Distress] : no acute distress [Well Developed] : well developed [Normal Sclera/Conjunctiva] : normal sclera/conjunctiva [No Proptosis] : no proptosis [No LAD] : no lymphadenopathy [Thyroid Not Enlarged] : the thyroid was not enlarged [No Thyroid Nodules] : no palpable thyroid nodules [No Accessory Muscle Use] : no accessory muscle use [No Respiratory Distress] : no respiratory distress [Clear to Auscultation] : lungs were clear to auscultation bilaterally [Normal Rate and Effort] : normal respiratory rate and effort [Normal S1, S2] : normal S1 and S2 [Regular Rhythm] : with a regular rhythm [Normal Rate] : heart rate was normal [Not Tender] : non-tender [Normal Bowel Sounds] : normal bowel sounds [No Rash] : no rash [Soft] : abdomen soft [Oriented x3] : oriented to person, place, and time [No Tremors] : no tremors [Acanthosis Nigricans] : no acanthosis nigricans [Normal Affect] : the affect was normal [Normal Insight/Judgement] : insight and judgment were intact [Normal Mood] : the mood was normal

## 2024-05-24 ENCOUNTER — APPOINTMENT (OUTPATIENT)
Dept: ENDOCRINOLOGY | Facility: CLINIC | Age: 59
End: 2024-05-24
Payer: MEDICARE

## 2024-05-24 VITALS
HEIGHT: 68 IN | SYSTOLIC BLOOD PRESSURE: 110 MMHG | WEIGHT: 215 LBS | BODY MASS INDEX: 32.58 KG/M2 | DIASTOLIC BLOOD PRESSURE: 70 MMHG | OXYGEN SATURATION: 97 % | HEART RATE: 74 BPM

## 2024-05-24 DIAGNOSIS — E10.21 TYPE 1 DIABETES MELLITUS WITH DIABETIC NEPHROPATHY: ICD-10-CM

## 2024-05-24 DIAGNOSIS — E78.00 PURE HYPERCHOLESTEROLEMIA, UNSPECIFIED: ICD-10-CM

## 2024-05-24 LAB — GLUCOSE BLDC GLUCOMTR-MCNC: 148

## 2024-05-24 PROCEDURE — G2211 COMPLEX E/M VISIT ADD ON: CPT

## 2024-05-24 PROCEDURE — 95251 CONT GLUC MNTR ANALYSIS I&R: CPT

## 2024-05-24 PROCEDURE — 99214 OFFICE O/P EST MOD 30 MIN: CPT

## 2024-05-24 RX ORDER — BLOOD-GLUCOSE TRANSMITTER
EACH MISCELLANEOUS
Qty: 1 | Refills: 3 | Status: DISCONTINUED | COMMUNITY
Start: 2020-06-25 | End: 2024-05-24

## 2024-05-24 RX ORDER — BLOOD-GLUCOSE SENSOR
EACH MISCELLANEOUS
Qty: 3 | Refills: 3 | Status: DISCONTINUED | COMMUNITY
Start: 2020-06-25 | End: 2024-05-24

## 2024-05-24 RX ORDER — BLOOD-GLUCOSE SENSOR
EACH MISCELLANEOUS
Qty: 9 | Refills: 3 | Status: ACTIVE | OUTPATIENT
Start: 2024-05-24

## 2024-05-24 NOTE — PHYSICAL EXAM
[Clear to Auscultation] : lungs were clear to auscultation bilaterally [Normal Rate] : heart rate was normal [de-identified] : thyroid difficult to palpate

## 2024-05-24 NOTE — ASSESSMENT
[FreeTextEntry1] : DM type 1, very good control on pump therapy try to upgrade to g7 dexcom  Hyperlipidemia, well controlled, continue statin  Hypertension, good control, continue current medication regimen   This patient with diabetes - Is currently using CGM and is receiving insulin using an insulin pump - Is adjusting insulin dose using a correction factor programmed into the pump, as documented in the medical record - Has been seen in the office by a provider within the last six months to review CGM data with their provider - Has completed a diabetes education program Dexcom require one test strip daily to use in case of sensor failure or for blood glucose verification or during sensor warmup.  Repeat labs before next visit.  RTO in 3 months with Dr. Muñoz.
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
## 2024-06-10 ENCOUNTER — RX RENEWAL (OUTPATIENT)
Age: 59
End: 2024-06-10

## 2024-06-10 RX ORDER — INSULIN LISPRO 100 [IU]/ML
100 INJECTION, SOLUTION INTRAVENOUS; SUBCUTANEOUS
Qty: 30 | Refills: 0 | Status: ACTIVE | COMMUNITY
Start: 2022-07-15 | End: 1900-01-01

## 2024-07-05 ENCOUNTER — RX RENEWAL (OUTPATIENT)
Age: 59
End: 2024-07-05

## 2024-07-10 ENCOUNTER — TRANSCRIPTION ENCOUNTER (OUTPATIENT)
Age: 59
End: 2024-07-10

## 2024-07-11 ENCOUNTER — TRANSCRIPTION ENCOUNTER (OUTPATIENT)
Age: 59
End: 2024-07-11

## 2024-08-20 ENCOUNTER — APPOINTMENT (OUTPATIENT)
Dept: ENDOCRINOLOGY | Facility: CLINIC | Age: 59
End: 2024-08-20
Payer: MEDICARE

## 2024-08-20 DIAGNOSIS — Z60.2 PROBLEMS RELATED TO LIVING ALONE: ICD-10-CM

## 2024-08-20 DIAGNOSIS — E10.65 TYPE 1 DIABETES MELLITUS WITH HYPERGLYCEMIA: ICD-10-CM

## 2024-08-20 PROCEDURE — G0108 DIAB MANAGE TRN  PER INDIV: CPT | Mod: GA

## 2024-08-20 SDOH — SOCIAL STABILITY - SOCIAL INSECURITY: PROBLEMS RELATED TO LIVING ALONE: Z60.2

## 2024-09-03 ENCOUNTER — APPOINTMENT (OUTPATIENT)
Dept: ENDOCRINOLOGY | Facility: CLINIC | Age: 59
End: 2024-09-03
Payer: MEDICARE

## 2024-09-03 VITALS
HEART RATE: 76 BPM | HEIGHT: 68 IN | SYSTOLIC BLOOD PRESSURE: 120 MMHG | WEIGHT: 210 LBS | BODY MASS INDEX: 31.83 KG/M2 | OXYGEN SATURATION: 98 % | DIASTOLIC BLOOD PRESSURE: 70 MMHG

## 2024-09-03 DIAGNOSIS — E55.9 VITAMIN D DEFICIENCY, UNSPECIFIED: ICD-10-CM

## 2024-09-03 DIAGNOSIS — E10.65 TYPE 1 DIABETES MELLITUS WITH HYPERGLYCEMIA: ICD-10-CM

## 2024-09-03 DIAGNOSIS — I10 ESSENTIAL (PRIMARY) HYPERTENSION: ICD-10-CM

## 2024-09-03 DIAGNOSIS — E78.00 PURE HYPERCHOLESTEROLEMIA, UNSPECIFIED: ICD-10-CM

## 2024-09-03 LAB — GLUCOSE BLDC GLUCOMTR-MCNC: 197

## 2024-09-03 PROCEDURE — 95251 CONT GLUC MNTR ANALYSIS I&R: CPT

## 2024-09-03 PROCEDURE — 82962 GLUCOSE BLOOD TEST: CPT

## 2024-09-03 PROCEDURE — 99214 OFFICE O/P EST MOD 30 MIN: CPT

## 2024-09-03 RX ORDER — SULFAMETHOXAZOLE AND TRIMETHOPRIM 800; 160 MG/1; MG/1
800-160 TABLET ORAL
Qty: 14 | Refills: 0 | Status: DISCONTINUED | COMMUNITY
Start: 2024-08-27

## 2024-09-03 RX ORDER — NITROFURANTOIN (MONOHYDRATE/MACROCRYSTALS) 25; 75 MG/1; MG/1
100 CAPSULE ORAL
Qty: 14 | Refills: 0 | Status: DISCONTINUED | COMMUNITY
Start: 2024-07-22

## 2024-09-03 NOTE — PHYSICAL EXAM
[Alert] : alert [Well Nourished] : well nourished [No Acute Distress] : no acute distress [Well Developed] : well developed [Normal Sclera/Conjunctiva] : normal sclera/conjunctiva [No Proptosis] : no proptosis [No LAD] : no lymphadenopathy [Thyroid Not Enlarged] : the thyroid was not enlarged [No Thyroid Nodules] : no palpable thyroid nodules [No Respiratory Distress] : no respiratory distress [No Accessory Muscle Use] : no accessory muscle use [Normal Rate and Effort] : normal respiratory rate and effort [Clear to Auscultation] : lungs were clear to auscultation bilaterally [Normal S1, S2] : normal S1 and S2 [Normal Rate] : heart rate was normal [Regular Rhythm] : with a regular rhythm [Normal Bowel Sounds] : normal bowel sounds [Not Tender] : non-tender [Soft] : abdomen soft [No Rash] : no rash [No Tremors] : no tremors [Oriented x3] : oriented to person, place, and time [Normal Affect] : the affect was normal [Normal Insight/Judgement] : insight and judgment were intact [Normal Mood] : the mood was normal [Acanthosis Nigricans] : no acanthosis nigricans [de-identified] : impaired balance due MS.

## 2024-09-03 NOTE — REVIEW OF SYSTEMS
[Fatigue] : fatigue [Recent Weight Loss (___ Lbs)] : recent weight loss: [unfilled] lbs [Constipation] : constipation [Diarrhea] : diarrhea [Decreased Appetite] : appetite not decreased [Visual Field Defect] : no visual field defect [Blurred Vision] : no blurred vision [Dysphagia] : no dysphagia [Neck Pain] : no neck pain [Dysphonia] : no dysphonia [Chest Pain] : no chest pain [Palpitations] : no palpitations [Polyuria] : no polyuria [Dysuria] : no dysuria [Headaches] : no headaches [Tremors] : no tremors [Polydipsia] : no polydipsia [FreeTextEntry2] : due to MS [FreeTextEntry7] : due to ABx [FreeTextEntry8] : self cath, no changes  [de-identified] : controlled with medication and therapy
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
## 2024-09-03 NOTE — ASSESSMENT
[FreeTextEntry1] : DM type 1, recent A1c 6.2%, CGM with occasional elevations due to recent infection. Continue current pump settings. Encouraged to bolus before meals. He will work on carb counting and modifying diet. Will download pump in 2 weeks for possible changes.   Hyperlipidemia, well controlled, continue statin  Hypertension, good control, continue current medication regimen   This patient with diabetes - Is currently using CGM and is receiving insulin using an insulin pump - Is adjusting insulin dose using a correction factor programmed into the pump, as documented in the medical record - Has been seen in the office by a provider within the last six months to review CGM data with their provider - Has completed a diabetes education program Dexcom require one test strip daily to use in case of sensor failure or for blood glucose verification or during sensor warmup.  Repeat labs before next visit. RTO in 3 months with Dr. Muñoz.

## 2024-10-10 RX ORDER — INSULIN PUMP CARTRIDGE
CARTRIDGE (EA) SUBCUTANEOUS
Qty: 5 | Refills: 1 | Status: ACTIVE | COMMUNITY
Start: 2024-10-10 | End: 1900-01-01

## 2024-10-10 RX ORDER — INFUSION SET FOR INSULIN PUMP
INFUSION SETS-PARAPHERNALIA MISCELLANEOUS
Qty: 5 | Refills: 1 | Status: ACTIVE | COMMUNITY
Start: 2024-10-10 | End: 1900-01-01

## 2024-10-15 ENCOUNTER — TRANSCRIPTION ENCOUNTER (OUTPATIENT)
Age: 59
End: 2024-10-15

## 2024-10-23 ENCOUNTER — RX RENEWAL (OUTPATIENT)
Age: 59
End: 2024-10-23

## 2025-01-29 ENCOUNTER — APPOINTMENT (OUTPATIENT)
Dept: ENDOCRINOLOGY | Facility: CLINIC | Age: 60
End: 2025-01-29
Payer: MEDICARE

## 2025-01-29 ENCOUNTER — NON-APPOINTMENT (OUTPATIENT)
Age: 60
End: 2025-01-29

## 2025-01-29 VITALS
DIASTOLIC BLOOD PRESSURE: 70 MMHG | HEART RATE: 74 BPM | OXYGEN SATURATION: 99 % | WEIGHT: 215 LBS | BODY MASS INDEX: 32.58 KG/M2 | HEIGHT: 68 IN | SYSTOLIC BLOOD PRESSURE: 134 MMHG

## 2025-01-29 DIAGNOSIS — E10.65 TYPE 1 DIABETES MELLITUS WITH HYPERGLYCEMIA: ICD-10-CM

## 2025-01-29 LAB
GLUCOSE BLDC GLUCOMTR-MCNC: 196
HBA1C MFR BLD HPLC: 6.2
LDLC SERPL DIRECT ASSAY-MCNC: 73
TSH SERPL-ACNC: 2.81

## 2025-01-29 PROCEDURE — G2211 COMPLEX E/M VISIT ADD ON: CPT

## 2025-01-29 PROCEDURE — 99214 OFFICE O/P EST MOD 30 MIN: CPT

## 2025-01-29 PROCEDURE — 95251 CONT GLUC MNTR ANALYSIS I&R: CPT

## 2025-01-29 PROCEDURE — 82962 GLUCOSE BLOOD TEST: CPT

## 2025-04-15 ENCOUNTER — TRANSCRIPTION ENCOUNTER (OUTPATIENT)
Age: 60
End: 2025-04-15

## 2025-04-22 ENCOUNTER — TRANSCRIPTION ENCOUNTER (OUTPATIENT)
Age: 60
End: 2025-04-22

## 2025-04-22 ENCOUNTER — APPOINTMENT (OUTPATIENT)
Dept: ENDOCRINOLOGY | Facility: CLINIC | Age: 60
End: 2025-04-22
Payer: MEDICARE

## 2025-04-22 VITALS
OXYGEN SATURATION: 98 % | SYSTOLIC BLOOD PRESSURE: 120 MMHG | HEART RATE: 57 BPM | WEIGHT: 220 LBS | HEIGHT: 68 IN | BODY MASS INDEX: 33.34 KG/M2 | DIASTOLIC BLOOD PRESSURE: 70 MMHG

## 2025-04-22 DIAGNOSIS — E78.00 PURE HYPERCHOLESTEROLEMIA, UNSPECIFIED: ICD-10-CM

## 2025-04-22 DIAGNOSIS — I10 ESSENTIAL (PRIMARY) HYPERTENSION: ICD-10-CM

## 2025-04-22 DIAGNOSIS — E10.65 TYPE 1 DIABETES MELLITUS WITH HYPERGLYCEMIA: ICD-10-CM

## 2025-04-22 DIAGNOSIS — E55.9 VITAMIN D DEFICIENCY, UNSPECIFIED: ICD-10-CM

## 2025-04-22 LAB — GLUCOSE BLDC GLUCOMTR-MCNC: 99

## 2025-04-22 PROCEDURE — 99214 OFFICE O/P EST MOD 30 MIN: CPT

## 2025-04-22 PROCEDURE — 95251 CONT GLUC MNTR ANALYSIS I&R: CPT

## 2025-04-22 PROCEDURE — 82962 GLUCOSE BLOOD TEST: CPT

## 2025-04-29 ENCOUNTER — TRANSCRIPTION ENCOUNTER (OUTPATIENT)
Age: 60
End: 2025-04-29

## 2025-05-06 ENCOUNTER — TRANSCRIPTION ENCOUNTER (OUTPATIENT)
Age: 60
End: 2025-05-06

## 2025-05-13 ENCOUNTER — TRANSCRIPTION ENCOUNTER (OUTPATIENT)
Age: 60
End: 2025-05-13

## 2025-05-15 ENCOUNTER — TRANSCRIPTION ENCOUNTER (OUTPATIENT)
Age: 60
End: 2025-05-15

## 2025-08-19 ENCOUNTER — APPOINTMENT (OUTPATIENT)
Dept: ENDOCRINOLOGY | Facility: CLINIC | Age: 60
End: 2025-08-19
Payer: MEDICARE

## 2025-08-19 VITALS
HEART RATE: 71 BPM | OXYGEN SATURATION: 96 % | SYSTOLIC BLOOD PRESSURE: 124 MMHG | WEIGHT: 223 LBS | HEIGHT: 68 IN | BODY MASS INDEX: 33.8 KG/M2 | DIASTOLIC BLOOD PRESSURE: 60 MMHG

## 2025-08-19 DIAGNOSIS — E10.21 TYPE 1 DIABETES MELLITUS WITH DIABETIC NEPHROPATHY: ICD-10-CM

## 2025-08-19 LAB — GLUCOSE BLDC GLUCOMTR-MCNC: 153

## 2025-08-19 PROCEDURE — G2211 COMPLEX E/M VISIT ADD ON: CPT

## 2025-08-19 PROCEDURE — 99214 OFFICE O/P EST MOD 30 MIN: CPT

## 2025-08-19 PROCEDURE — 82962 GLUCOSE BLOOD TEST: CPT

## 2025-08-19 PROCEDURE — 95251 CONT GLUC MNTR ANALYSIS I&R: CPT
